# Patient Record
Sex: FEMALE
[De-identification: names, ages, dates, MRNs, and addresses within clinical notes are randomized per-mention and may not be internally consistent; named-entity substitution may affect disease eponyms.]

---

## 2018-08-10 ENCOUNTER — HOSPITAL ENCOUNTER (EMERGENCY)
Dept: HOSPITAL 14 - H.ER | Age: 53
Discharge: HOME | End: 2018-08-10
Payer: COMMERCIAL

## 2018-08-10 VITALS
RESPIRATION RATE: 18 BRPM | DIASTOLIC BLOOD PRESSURE: 62 MMHG | SYSTOLIC BLOOD PRESSURE: 127 MMHG | TEMPERATURE: 97.9 F | HEART RATE: 77 BPM

## 2018-08-10 VITALS — OXYGEN SATURATION: 98 %

## 2018-08-10 VITALS — BODY MASS INDEX: 26.2 KG/M2

## 2018-08-10 DIAGNOSIS — R07.89: Primary | ICD-10-CM

## 2018-08-10 DIAGNOSIS — E78.5: ICD-10-CM

## 2018-08-10 DIAGNOSIS — Z88.0: ICD-10-CM

## 2018-08-10 LAB
ALBUMIN SERPL-MCNC: 4.7 G/DL (ref 3.5–5)
ALBUMIN/GLOB SERPL: 1.5 {RATIO} (ref 1–2.1)
ALT SERPL-CCNC: 40 U/L (ref 9–52)
AST SERPL-CCNC: 38 U/L (ref 14–36)
BASOPHILS # BLD AUTO: 0.1 K/UL (ref 0–0.2)
BASOPHILS NFR BLD: 0.9 % (ref 0–2)
BUN SERPL-MCNC: 18 MG/DL (ref 7–17)
CALCIUM SERPL-MCNC: 9.5 MG/DL (ref 8.4–10.2)
EOSINOPHIL # BLD AUTO: 0.1 K/UL (ref 0–0.7)
EOSINOPHIL NFR BLD: 1.8 % (ref 0–4)
ERYTHROCYTE [DISTWIDTH] IN BLOOD BY AUTOMATED COUNT: 13.2 % (ref 11.5–14.5)
GFR NON-AFRICAN AMERICAN: > 60
HGB BLD-MCNC: 15.4 G/DL (ref 12–16)
LYMPHOCYTES # BLD AUTO: 2.1 K/UL (ref 1–4.3)
LYMPHOCYTES NFR BLD AUTO: 27 % (ref 20–40)
MCH RBC QN AUTO: 32.9 PG (ref 27–31)
MCHC RBC AUTO-ENTMCNC: 34 G/DL (ref 33–37)
MCV RBC AUTO: 96.8 FL (ref 81–99)
MONOCYTES # BLD: 0.9 K/UL (ref 0–0.8)
MONOCYTES NFR BLD: 11.2 % (ref 0–10)
NEUTROPHILS # BLD: 4.7 K/UL (ref 1.8–7)
NEUTROPHILS NFR BLD AUTO: 59.1 % (ref 50–75)
NRBC BLD AUTO-RTO: 0.1 % (ref 0–0)
PLATELET # BLD: 205 K/UL (ref 130–400)
PMV BLD AUTO: 8.6 FL (ref 7.2–11.7)
RBC # BLD AUTO: 4.67 MIL/UL (ref 3.8–5.2)
WBC # BLD AUTO: 7.9 K/UL (ref 4.8–10.8)

## 2018-08-10 PROCEDURE — 99285 EMERGENCY DEPT VISIT HI MDM: CPT

## 2018-08-10 PROCEDURE — 71046 X-RAY EXAM CHEST 2 VIEWS: CPT

## 2018-08-10 PROCEDURE — 93005 ELECTROCARDIOGRAM TRACING: CPT

## 2018-08-10 PROCEDURE — 85025 COMPLETE CBC W/AUTO DIFF WBC: CPT

## 2018-08-10 PROCEDURE — 85651 RBC SED RATE NONAUTOMATED: CPT

## 2018-08-10 PROCEDURE — 84484 ASSAY OF TROPONIN QUANT: CPT

## 2018-08-10 PROCEDURE — 80053 COMPREHEN METABOLIC PANEL: CPT

## 2018-08-10 PROCEDURE — 82550 ASSAY OF CK (CPK): CPT

## 2018-08-10 PROCEDURE — 85378 FIBRIN DEGRADE SEMIQUANT: CPT

## 2018-08-10 PROCEDURE — 96374 THER/PROPH/DIAG INJ IV PUSH: CPT

## 2018-08-10 NOTE — ED PDOC
HPI: Chest Pain


Time Seen by Provider: 08/10/18 09:06


Chief Complaint (Nursing): Chest Pain


Chief Complaint (Provider): Chest Pain


History Per: Patient


History/Exam Limitations: no limitations


Onset/Duration Of Symptoms: Days


Current Symptoms Are (Timing): Still Present


Context: Travel


Quality: Sharp


Additional Complaint(s): 


52 year old female with a past medical history of cardiac arrhythmia and 

cholesterol presents to the ED for an evaluation of chest pain onset today 

morning. Reports she felt a sharp pain on the left side and had difficulty 

breathing in the morning. She took Aleve, Metoprolol and Lipotrol without any 

relief. Patient states she went to the city yesterday and walked a lot so she 

felt sore last night. Denies calf pain, swelling, sick contacts, fever or 

cough. 


PMD: Carter Garcia








Past Medical History


Reviewed: Historical Data, Nursing Documentation, Vital Signs


Vital Signs: 


 Last Vital Signs











Temp  97.9 F   08/10/18 11:01


 


Pulse  77   08/10/18 11:01


 


Resp  18   08/10/18 11:01


 


BP  127/62   08/10/18 11:01


 


Pulse Ox  98   08/10/18 11:03














- Medical History


PMH: Cardia Arrhythmia, Hyperlipidemia


   Denies: Kidney Stones, Chronic Kidney Disease





- Family History


Family History: States: No Known Family Hx, Other


Other Family History: father has heart disease





- Social History


Current smoker - smoking cessation education provided: No


Alcohol: None


Drugs: Denies





- Immunization History


Hx Tetanus Toxoid Vaccination: No


Hx Influenza Vaccination: No


Hx Pneumococcal Vaccination: No





- Home Medications


Home Medications: 


 Ambulatory Orders











 Medication  Instructions  Recorded


 


Ketorolac Tromethamine [Toradol] 10 mg PO Q6H PRN #19 tab 08/10/18














- Allergies


Allergies/Adverse Reactions: 


 Allergies











Allergy/AdvReac Type Severity Reaction Status Date / Time


 


Penicillins Allergy Mild RASH Verified 08/10/18 08:30














Review of Systems


ROS Statement: Except As Marked, All Systems Reviewed And Found Negative


Constitutional: Negative for: Fever


Cardiovascular: Positive for: Chest Pain


Respiratory: Positive for: Shortness of Breath.  Negative for: Cough


Musculoskeletal: Negative for: Leg Pain





Physical Exam





- Reviewed


Nursing Documentation Reviewed: Yes


Vital Signs Reviewed: Yes





- Physical Exam


Appears: Positive for: Well, Non-toxic, No Acute Distress


Head Exam: Positive for: ATRAUMATIC, NORMAL INSPECTION, NORMOCEPHALIC


Skin: Positive for: Normal Color, Warm, Dry


Eye Exam: Positive for: EOMI, Normal appearance, PERRL


ENT: Positive for: Normal ENT Inspection


Neck: Positive for: Normal, Painless ROM, Supple.  Negative for: Decreased ROM


Cardiovascular/Chest: Positive for: Regular Rate, Rhythm.  Negative for: Murmur


Respiratory: Positive for: Normal Breath Sounds.  Negative for: Decreased 

Breath Sounds, Wheezing, Respiratory Distress


Gastrointestinal/Abdominal: Positive for: Normal Exam, Bowel Sounds, Soft.  

Negative for: Tenderness, Guarding, Rebound


Back: Positive for: Normal Inspection.  Negative for: L CVA Tenderness, R CVA 

Tenderness


Extremity: Positive for: Normal ROM.  Negative for: Tenderness, Pedal Edema, 

Deformity


Neurologic/Psych: Positive for: Alert, Oriented (x3)





- Laboratory Results


Result Diagrams: 


 08/10/18 09:15





 08/10/18 09:15





- ECG


O2 Sat by Pulse Oximetry: 98 (RA)


Pulse Ox Interpretation: Normal





Medical Decision Making


Medical Decision Making: 


Time: 0907


Initial Impression: chest pain


Initial Plan:


--EKG


--Creatine Phosphokinase


--Troponin I  


--CMP


--D Dime [COAG] 


--CBC w/ Differential 


--ESR [Erythrocyte sedimentation rate]


--Chest Two Views [RAD]


--Toradol 30mg 


--Cardiac Monitor 


--IV Insertion 


--Reevaluation 





Time: 0958


Chest PA and lateral


FINDINGS:


LUNGS:


No active pulmonary disease.


PLEURA:


No significant pleural effusion identified. No pneumothorax apparent.


CARDIOVASCULAR:


Normal.


OSSEOUS STRUCTURES:


No significant abnormalities.


VISUALIZED UPPER ABDOMEN:


Normal.


OTHER FINDINGS:


None.


IMPRESSION:


No acute cardiopulmonary disease appreciated.





Clinical Impression: chest pain





Upon provider evaluation patient is medically stable, and requires no further 

treatment in the ED at this time. Patient will be discharged with Toradol 10mg 

for pain. Counseling was provided and all questions were answered regarding 

diagnosis and need for follow up with PMD. There is agreement to discharge 

plan. Return if symptoms persist or worsen.


--------------------------------------------------------------------------------

-----------------


Scribe Attestation:   


Documented by Shaka Hoang, acting as a scribe for Leelee Chaparro MD





Provider Scribe Attestation:


All medical record entries made by the Scribe were at my direction and 

personally dictated by me. I have reviewed the chart and agree that the record 

accurately reflects my personal performance of the history, physical exam, 

medical decision making, and the department course for this patient. I have 

also personally directed, reviewed, and agree with the discharge instructions 

and disposition.





 








Disposition





- Clinical Impression


Clinical Impression: 


 Chest pain in adult








- Disposition


Referrals: 


[facility] [place] [Outside]


Disposition Time: 11:10


Condition: STABLE


Additional Instructions: 


Please followup with your personal physician and specialist if symptoms do not 

improve or return to the ER if they worsen.


Prescriptions: 


Ketorolac Tromethamine [Toradol] 10 mg PO Q6H PRN #19 tab


 PRN Reason: Pain, Moderate (4-7)


Instructions:  Chest Pain That Is Not Caused by the Heart (DC)


Forms:  [facility] (English), [facility] ED School/Work Excuse





- POA


Present On Arrival: None

## 2018-08-10 NOTE — CARD
--------------- APPROVED REPORT --------------





Date of service: 08/10/2018



EKG Measurement

Heart [id number]

HI [id number]

[id number]

[id number]

[id number]



<Conclusion>

Sinus bradycardia

Borderline ECG

## 2018-08-10 NOTE — RAD
Date of service: 



08/10/2018



HISTORY:

left sided pleuritic pain  



COMPARISON:

No prior.



TECHNIQUE:

Chest PA and lateral



FINDINGS:



LUNGS:

No active pulmonary disease.



PLEURA:

No significant pleural effusion identified. No pneumothorax apparent.



CARDIOVASCULAR:

Normal.



OSSEOUS STRUCTURES:

No significant abnormalities.



VISUALIZED UPPER ABDOMEN:

Normal.



OTHER FINDINGS:

None.



IMPRESSION:

No acute cardiopulmonary disease appreciated.